# Patient Record
Sex: MALE | Race: WHITE | URBAN - METROPOLITAN AREA
[De-identification: names, ages, dates, MRNs, and addresses within clinical notes are randomized per-mention and may not be internally consistent; named-entity substitution may affect disease eponyms.]

---

## 2021-05-24 ENCOUNTER — OFFICE VISIT (OUTPATIENT)
Dept: URBAN - METROPOLITAN AREA CLINIC 35 | Facility: CLINIC | Age: 51
End: 2021-05-24

## 2021-08-02 ENCOUNTER — OFFICE VISIT (OUTPATIENT)
Dept: URBAN - METROPOLITAN AREA CLINIC 35 | Facility: CLINIC | Age: 51
End: 2021-08-02

## 2021-08-02 ENCOUNTER — LAB OUTSIDE AN ENCOUNTER (OUTPATIENT)
Dept: URBAN - METROPOLITAN AREA CLINIC 35 | Facility: CLINIC | Age: 51
End: 2021-08-02

## 2021-08-02 VITALS
BODY MASS INDEX: 27.3 KG/M2 | HEIGHT: 71 IN | WEIGHT: 195 LBS | SYSTOLIC BLOOD PRESSURE: 130 MMHG | DIASTOLIC BLOOD PRESSURE: 70 MMHG | HEART RATE: 63 BPM | OXYGEN SATURATION: 98 %

## 2021-08-02 RX ORDER — SODIUM PICOSULFATE, MAGNESIUM OXIDE, AND ANHYDROUS CITRIC ACID 10; 3.5; 12 MG/160ML; G/160ML; G/160ML
160 ML LIQUID ORAL
Qty: 1 KIT | Refills: 0 | OUTPATIENT
Start: 2021-08-02

## 2021-08-02 RX ORDER — FLUTICASONE PROPIONATE 50 UG/1
1 SPRAY IN EACH NOSTRIL SPRAY, METERED NASAL ONCE A DAY
Status: ACTIVE | COMMUNITY

## 2021-08-02 RX ORDER — FAMOTIDINE, CALCIUM CARBONATE, AND MAGNESIUM HYDROXIDE 10; 800; 165 MG/1; MG/1; MG/1
1 TABLET AS NEEDED TABLET, CHEWABLE ORAL TWICE A DAY
Status: ACTIVE | COMMUNITY

## 2021-08-02 NOTE — HPI-MIGRATED HPI
;   ;     Heartburn : Patient states he has had heartburn for years.  He will take Pepcid on occasionl with relief of symptoms.  He saw a GI years ago, and was told it was "part of life, and he's getting older.  He started a probiotic which has really helped symptoms.  He will take a Pepcid once a week or so.  No nausea, emesis, abd pain, early satiety, dysphagia, bloating/gas.;   Colorectal Cancer Screening : 51 year old  male who presents for colorectal cancer screening consult. Patient admits this will be his first colonoscopy. Patient denies family history of colon cancer. Patient denies family hx of colon polyps. Patient admits normal bowel habits at this time with at least 1-2 BMs a day. Patient denies any current symptoms of rectal bleeding, melena or mucus.    ;

## 2021-09-23 ENCOUNTER — OFFICE VISIT (OUTPATIENT)
Dept: URBAN - METROPOLITAN AREA SURGERY CENTER 8 | Facility: SURGERY CENTER | Age: 51
End: 2021-09-23

## 2021-10-13 ENCOUNTER — OFFICE VISIT (OUTPATIENT)
Dept: URBAN - METROPOLITAN AREA CLINIC 35 | Facility: CLINIC | Age: 51
End: 2021-10-13

## 2021-10-13 RX ORDER — FAMOTIDINE, CALCIUM CARBONATE, AND MAGNESIUM HYDROXIDE 10; 800; 165 MG/1; MG/1; MG/1
1 TABLET AS NEEDED TABLET, CHEWABLE ORAL TWICE A DAY
Status: ACTIVE | COMMUNITY

## 2021-10-13 RX ORDER — FLUTICASONE PROPIONATE 50 UG/1
1 SPRAY IN EACH NOSTRIL SPRAY, METERED NASAL ONCE A DAY
Status: ACTIVE | COMMUNITY

## 2021-10-13 RX ORDER — SODIUM PICOSULFATE, MAGNESIUM OXIDE, AND ANHYDROUS CITRIC ACID 10; 3.5; 12 MG/160ML; G/160ML; G/160ML
160 ML LIQUID ORAL
Qty: 1 KIT | Refills: 0 | Status: ACTIVE | COMMUNITY
Start: 2021-08-02

## 2021-10-13 NOTE — HPI-MIGRATED HPI
;   ;     Heartburn : Patient presents today for follow up to his EGD. His EGD was completed on 09/23/2021 with Dr Garibay results noted below. Since the procedure admits/denies any complications. He_______ dysphagia, globus, changes in appetite, and changes in bowel habits.          Last visit 08/02/2021 Patient states he has had heartburn for years.  He will take Pepcid on occasional with relief of symptoms.  He saw a GI years ago, and was told it was "part of life, and he's getting older.  He started a probiotic which has really helped symptoms.  He will take a Pepcid once a week or so.  No nausea, emesis, abd pain, early satiety, dysphagia, bloating/gas.;   Colorectal Cancer Screening : Patient presents today for follow up to his colonoscopy.  His colonoscopy was completed on 09/23/2021 with Dr Garibay results noted below.  He admits/denies any complications after his procedure. Currently has __ bowel movements per day.  Stools are __with/without any melena, blood or mucus.          Last visit 08/02/2021 51 year old  male who presents for colorectal cancer screening consult. Patient admits this will be his first colonoscopy. Patient denies family history of colon cancer. Patient denies family hx of colon polyps. Patient admits normal bowel habits at this time with at least 1-2 BMs a day. Patient denies any current symptoms of rectal bleeding, melena or mucus.;

## 2021-10-25 ENCOUNTER — OFFICE VISIT (OUTPATIENT)
Dept: URBAN - METROPOLITAN AREA CLINIC 35 | Facility: CLINIC | Age: 51
End: 2021-10-25

## 2021-10-25 VITALS
HEIGHT: 71 IN | BODY MASS INDEX: 27.16 KG/M2 | DIASTOLIC BLOOD PRESSURE: 80 MMHG | HEART RATE: 66 BPM | SYSTOLIC BLOOD PRESSURE: 132 MMHG | WEIGHT: 194 LBS | OXYGEN SATURATION: 99 %

## 2021-10-25 PROBLEM — 196735001 CSG - CHRONIC SUPERFICIAL GASTRITIS: Status: ACTIVE | Noted: 2021-10-25

## 2021-10-25 PROBLEM — 398050005 DIVERTICULAR DISEASE OF COLON: Status: ACTIVE | Noted: 2021-10-25

## 2021-10-25 RX ORDER — SODIUM PICOSULFATE, MAGNESIUM OXIDE, AND ANHYDROUS CITRIC ACID 10; 3.5; 12 MG/160ML; G/160ML; G/160ML
160 ML LIQUID ORAL
Qty: 1 KIT | Refills: 0 | Status: ON HOLD | COMMUNITY
Start: 2021-08-02

## 2021-10-25 RX ORDER — FAMOTIDINE, CALCIUM CARBONATE, AND MAGNESIUM HYDROXIDE 10; 800; 165 MG/1; MG/1; MG/1
1 TABLET AS NEEDED TABLET, CHEWABLE ORAL TWICE A DAY
Status: ACTIVE | COMMUNITY

## 2021-10-25 RX ORDER — FLUTICASONE PROPIONATE 50 UG/1
1 SPRAY IN EACH NOSTRIL SPRAY, METERED NASAL ONCE A DAY
Status: ACTIVE | COMMUNITY

## 2021-10-25 NOTE — HPI-MIGRATED HPI
;   ;   ;   ;     Follow up- EGD : Patient presents today for follow up to his EGD which was completed on   (09/23/2021)  by Dr. Garibay.  Patient denies any complications after his procedure. Since the procedure, the patient denies dysphagia, heartburn, globus, changes in appetite, abdominal/epigastric pain or changes in bowel habits.   EGD report shows: Erythematous and eroded mucosa in the gastric antrum. Biopsied. Erythematous duodenopathy. Normal second portion of the duodenum and third portion of the duodenum. Z-line regular, 38 cm from the incisors. Normal esophagus. Biopsies were taken with a cold forceps for histology in the distal esophagus.;   Heartburn : Patient denies symptoms of heartburn.  Patient admits that he is still taking the Pepcid medication which have been helping him with his condition.  He also stopped NSAID use, which helped tremendously.    Last visit 08/02/2021 Patient states he has had heartburn for years.  He will take Pepcid on occasional with relief of symptoms.  He saw a GI years ago, and was told it was "part of life, and he's getting older.  He started a probiotic which has really helped symptoms.  He will take a Pepcid once a week or so.  No nausea, emesis, abd pain, early satiety, dysphagia, bloating/gas.;   Colonoscopy Follow-Up : Patient presents today for follow up to his colonoscopy which was completed on (09/23/2021) by Dr. Garibay.  Patient denies any complications after his procedure. Patient currently admits 1-2 formed bowel movements per day.  Patient denies any melena, blood or mucus in stools. Patient denies any associated abdominal pain, heartburn, bloating, or gas.    ;   Colorectal Cancer Screening : (Last visit 08/02/2021) 51 year old  male who presents for colorectal cancer screening consult. Patient admits this will be his first colonoscopy. Patient denies family history of colon cancer. Patient denies family hx of colon polyps. Patient admits normal bowel habits at this time with at least 1-2 BMs a day. Patient denies any current symptoms of rectal bleeding, melena or mucus.;